# Patient Record
Sex: MALE | Race: WHITE | NOT HISPANIC OR LATINO
[De-identification: names, ages, dates, MRNs, and addresses within clinical notes are randomized per-mention and may not be internally consistent; named-entity substitution may affect disease eponyms.]

---

## 2017-05-23 ENCOUNTER — APPOINTMENT (OUTPATIENT)
Dept: CARDIOLOGY | Facility: CLINIC | Age: 32
End: 2017-05-23

## 2017-05-23 ENCOUNTER — APPOINTMENT (OUTPATIENT)
Dept: CV DIAGNOSITCS | Facility: HOSPITAL | Age: 32
End: 2017-05-23
Payer: COMMERCIAL

## 2017-05-23 ENCOUNTER — NON-APPOINTMENT (OUTPATIENT)
Age: 32
End: 2017-05-23

## 2017-05-23 ENCOUNTER — OUTPATIENT (OUTPATIENT)
Dept: OUTPATIENT SERVICES | Facility: HOSPITAL | Age: 32
LOS: 1 days | End: 2017-05-23

## 2017-05-23 VITALS — BODY MASS INDEX: 25.1 KG/M2 | WEIGHT: 240 LBS

## 2017-05-23 DIAGNOSIS — R07.9 CHEST PAIN, UNSPECIFIED: ICD-10-CM

## 2017-05-23 DIAGNOSIS — Z00.00 ENCOUNTER FOR GENERAL ADULT MEDICAL EXAMINATION WITHOUT ABNORMAL FINDINGS: ICD-10-CM

## 2017-05-23 DIAGNOSIS — I71.9 AORTIC ANEURYSM OF UNSPECIFIED SITE, WITHOUT RUPTURE: ICD-10-CM

## 2017-05-23 PROCEDURE — 93306 TTE W/DOPPLER COMPLETE: CPT | Mod: 26

## 2017-06-20 ENCOUNTER — MEDICATION RENEWAL (OUTPATIENT)
Age: 32
End: 2017-06-20

## 2017-06-27 ENCOUNTER — RX RENEWAL (OUTPATIENT)
Age: 32
End: 2017-06-27

## 2017-07-31 ENCOUNTER — MEDICATION RENEWAL (OUTPATIENT)
Age: 32
End: 2017-07-31

## 2018-05-01 ENCOUNTER — APPOINTMENT (OUTPATIENT)
Dept: CARDIOLOGY | Facility: CLINIC | Age: 33
End: 2018-05-01

## 2018-05-22 ENCOUNTER — NON-APPOINTMENT (OUTPATIENT)
Age: 33
End: 2018-05-22

## 2018-05-22 ENCOUNTER — APPOINTMENT (OUTPATIENT)
Dept: CV DIAGNOSITCS | Facility: HOSPITAL | Age: 33
End: 2018-05-22
Payer: COMMERCIAL

## 2018-05-22 ENCOUNTER — OUTPATIENT (OUTPATIENT)
Dept: OUTPATIENT SERVICES | Facility: HOSPITAL | Age: 33
LOS: 1 days | End: 2018-05-22

## 2018-05-22 ENCOUNTER — APPOINTMENT (OUTPATIENT)
Dept: CARDIOLOGY | Facility: CLINIC | Age: 33
End: 2018-05-22
Payer: COMMERCIAL

## 2018-05-22 VITALS
WEIGHT: 261 LBS | BODY MASS INDEX: 30.2 KG/M2 | OXYGEN SATURATION: 99 % | RESPIRATION RATE: 16 BRPM | SYSTOLIC BLOOD PRESSURE: 143 MMHG | HEART RATE: 82 BPM | HEIGHT: 78 IN | DIASTOLIC BLOOD PRESSURE: 81 MMHG

## 2018-05-22 DIAGNOSIS — Q87.40 MARFAN SYNDROME, UNSPECIFIED: ICD-10-CM

## 2018-05-22 DIAGNOSIS — I71.9 AORTIC ANEURYSM OF UNSPECIFIED SITE, WITHOUT RUPTURE: ICD-10-CM

## 2018-05-22 PROCEDURE — 99213 OFFICE O/P EST LOW 20 MIN: CPT

## 2018-05-22 PROCEDURE — 93306 TTE W/DOPPLER COMPLETE: CPT | Mod: 26

## 2018-05-22 PROCEDURE — 93000 ELECTROCARDIOGRAM COMPLETE: CPT

## 2018-06-05 ENCOUNTER — APPOINTMENT (OUTPATIENT)
Dept: CARDIOLOGY | Facility: CLINIC | Age: 33
End: 2018-06-05

## 2019-05-07 ENCOUNTER — APPOINTMENT (OUTPATIENT)
Dept: CV DIAGNOSITCS | Facility: HOSPITAL | Age: 34
End: 2019-05-07

## 2019-05-07 ENCOUNTER — OUTPATIENT (OUTPATIENT)
Dept: OUTPATIENT SERVICES | Facility: HOSPITAL | Age: 34
LOS: 1 days | End: 2019-05-07
Payer: COMMERCIAL

## 2019-05-07 ENCOUNTER — NON-APPOINTMENT (OUTPATIENT)
Age: 34
End: 2019-05-07

## 2019-05-07 ENCOUNTER — TRANSCRIPTION ENCOUNTER (OUTPATIENT)
Age: 34
End: 2019-05-07

## 2019-05-07 ENCOUNTER — APPOINTMENT (OUTPATIENT)
Dept: CARDIOLOGY | Facility: CLINIC | Age: 34
End: 2019-05-07
Payer: COMMERCIAL

## 2019-05-07 VITALS
DIASTOLIC BLOOD PRESSURE: 77 MMHG | OXYGEN SATURATION: 99 % | SYSTOLIC BLOOD PRESSURE: 134 MMHG | HEIGHT: 78 IN | BODY MASS INDEX: 28.13 KG/M2 | HEART RATE: 63 BPM | WEIGHT: 243.13 LBS

## 2019-05-07 DIAGNOSIS — I71.9 AORTIC ANEURYSM OF UNSPECIFIED SITE, WITHOUT RUPTURE: ICD-10-CM

## 2019-05-07 PROCEDURE — 99214 OFFICE O/P EST MOD 30 MIN: CPT

## 2019-05-07 PROCEDURE — 93306 TTE W/DOPPLER COMPLETE: CPT | Mod: 26

## 2019-05-07 PROCEDURE — 93000 ELECTROCARDIOGRAM COMPLETE: CPT

## 2019-05-09 LAB
ALBUMIN SERPL ELPH-MCNC: 5 G/DL
ALP BLD-CCNC: 56 U/L
ALT SERPL-CCNC: 19 U/L
ANION GAP SERPL CALC-SCNC: 17 MMOL/L
AST SERPL-CCNC: 22 U/L
BASOPHILS # BLD AUTO: 0.03 K/UL
BASOPHILS NFR BLD AUTO: 0.5 %
BILIRUB SERPL-MCNC: 1.2 MG/DL
BUN SERPL-MCNC: 11 MG/DL
CALCIUM SERPL-MCNC: 10.4 MG/DL
CHLORIDE SERPL-SCNC: 101 MMOL/L
CHOLEST SERPL-MCNC: 247 MG/DL
CHOLEST/HDLC SERPL: 5.6 RATIO
CO2 SERPL-SCNC: 24 MMOL/L
CREAT SERPL-MCNC: 0.85 MG/DL
EOSINOPHIL # BLD AUTO: 0.07 K/UL
EOSINOPHIL NFR BLD AUTO: 1.1 %
ESTIMATED AVERAGE GLUCOSE: 91 MG/DL
GLUCOSE SERPL-MCNC: 71 MG/DL
HBA1C MFR BLD HPLC: 4.8 %
HCT VFR BLD CALC: 46.8 %
HDLC SERPL-MCNC: 44 MG/DL
HGB BLD-MCNC: 16.1 G/DL
IMM GRANULOCYTES NFR BLD AUTO: 0.3 %
LDLC SERPL CALC-MCNC: 158 MG/DL
LYMPHOCYTES # BLD AUTO: 1.45 K/UL
LYMPHOCYTES NFR BLD AUTO: 22.6 %
MAN DIFF?: NORMAL
MCHC RBC-ENTMCNC: 30.4 PG
MCHC RBC-ENTMCNC: 34.4 GM/DL
MCV RBC AUTO: 88.3 FL
MONOCYTES # BLD AUTO: 0.44 K/UL
MONOCYTES NFR BLD AUTO: 6.9 %
NEUTROPHILS # BLD AUTO: 4.41 K/UL
NEUTROPHILS NFR BLD AUTO: 68.6 %
PLATELET # BLD AUTO: 240 K/UL
POTASSIUM SERPL-SCNC: 3.9 MMOL/L
PROT SERPL-MCNC: 8.1 G/DL
RBC # BLD: 5.3 M/UL
RBC # FLD: 13.2 %
SODIUM SERPL-SCNC: 142 MMOL/L
TRIGL SERPL-MCNC: 223 MG/DL
WBC # FLD AUTO: 6.42 K/UL

## 2019-06-21 NOTE — ASSESSMENT
[FreeTextEntry1] : 33 year old white male Marfan syndrome, s/p Ao aneurysm repair with residual severe AR. \par \par Severe AR- will refer back to Dr. Cloud for reassessment of aortic valve. \par Will discuss with Dr. Cloud whether RADHA may be indicated at [place] or preference at [place].\par \par Palpitations and symptoms with exertion - concerning for exercise induced arrhythmia \par Cor Vitals holter monitor 48 years during exercise to assess arrhythmia during periods of exertion\par \par Anxiety - he is not willing to see Dr Jeong and would prefer to manage on his own.\par \par

## 2019-06-21 NOTE — REASON FOR VISIT
[FreeTextEntry1] : 33 year old white male with history of  Marfan syndrome,  s/p aortic aneurysm repair by Dr. Gómez Cloud on June 16 , 2015 with residual AR that has been progressing in severity. \par \par Has been trying to jog and be more active but after a jog for 1/2 mi , he begins to feel "unusual heart beats/ palpitations"  with associated symptoms of  lightheadedness  and then feels compelled to stop running.\par \par Of note, he has stopped his psych meds and his complaints of excessive perspiration has stopped . \par He has been challenged to find a good therapist and is trying on his own to manage his anxiety.\par \par He denies any significant dyspnea on exertion.

## 2019-06-21 NOTE — PHYSICAL EXAM
[Normal Conjunctiva] : the conjunctiva exhibited no abnormalities [Eyelids - No Xanthelasma] : the eyelids demonstrated no xanthelasmas [Normal Oral Mucosa] : normal oral mucosa [No Oral Pallor] : no oral pallor [No Oral Cyanosis] : no oral cyanosis [Normal Jugular Venous A Waves Present] : normal jugular venous A waves present [Normal Jugular Venous V Waves Present] : normal jugular venous V waves present [No Jugular Venous Cary A Waves] : no jugular venous cary A waves [Auscultation Breath Sounds / Voice Sounds] : lungs were clear to auscultation bilaterally [Respiration, Rhythm And Depth] : normal respiratory rhythm and effort [Exaggerated Use Of Accessory Muscles For Inspiration] : no accessory muscle use [No Precordial Heave] : no precordial heave was noted [Normal] : normal [Normal Rate] : normal [Rhythm Regular] : regular [Normal S1] : normal S1 [Normal S2] : normal S2 [II] : a grade 2 [No Murmur] : no murmurs heard [No Abnormalities] : the abdominal aorta was not enlarged and no bruit was heard [2+] : right 2+ [No Pitting Edema] : no pitting edema present [Abdomen Soft] : soft [Abdomen Mass (___ Cm)] : no abdominal mass palpated [Abdomen Tenderness] : non-tender [Abnormal Walk] : normal gait [Gait - Sufficient For Exercise Testing] : the gait was sufficient for exercise testing [Cyanosis, Localized] : no localized cyanosis [Nail Clubbing] : no clubbing of the fingernails [Petechial Hemorrhages (___cm)] : no petechial hemorrhages [Skin Color & Pigmentation] : normal skin color and pigmentation [] : no rash [Skin Lesions] : no skin lesions [No Venous Stasis] : no venous stasis [No Skin Ulcers] : no skin ulcer [No Xanthoma] : no  xanthoma was observed [Mood] : the mood was normal [Oriented To Time, Place, And Person] : oriented to person, place, and time [Affect] : the affect was normal [No Anxiety] : not feeling anxious [S3] : no S3 [S4] : no S4 [Click] : no click [Left Carotid Bruit] : no bruit heard over the left carotid [Right Carotid Bruit] : no bruit heard over the right carotid [Left Femoral Bruit] : no bruit heard over the left femoral artery [Right Femoral Bruit] : no bruit heard over the right femoral artery [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

## 2019-12-11 ENCOUNTER — APPOINTMENT (OUTPATIENT)
Dept: VASCULAR SURGERY | Facility: CLINIC | Age: 34
End: 2019-12-11
Payer: COMMERCIAL

## 2019-12-11 VITALS
DIASTOLIC BLOOD PRESSURE: 72 MMHG | HEART RATE: 74 BPM | WEIGHT: 220 LBS | HEIGHT: 78 IN | SYSTOLIC BLOOD PRESSURE: 138 MMHG | BODY MASS INDEX: 25.45 KG/M2

## 2019-12-11 PROCEDURE — 93971 EXTREMITY STUDY: CPT

## 2019-12-11 PROCEDURE — 99204 OFFICE O/P NEW MOD 45 MIN: CPT

## 2019-12-17 NOTE — PHYSICAL EXAM
[Respiratory Effort] : normal respiratory effort [No Rash or Lesion] : No rash or lesion [Alert] : alert [Oriented to Person] : oriented to person [Oriented to Place] : oriented to place [Oriented to Time] : oriented to time [Calm] : calm [Varicose Veins Of Lower Extremities] : present [Ankle Swelling Bilaterally] : severe [Varicose Veins Of The Right Leg] : of the right leg [2+] : right 2+ [JVD] : no jugular venous distention  [Ankle Swelling (On Exam)] : not present [] : not present [de-identified] : Well appearing, NAD [FreeTextEntry1] : Bulging varicose veins on posterior aspect of right leg at the level of the popliteal fossa and extending to proximal calf  [de-identified] : NCAT [de-identified] : FROM

## 2019-12-17 NOTE — PROCEDURE
[FreeTextEntry1] : RLE venous duplex shows no signs of DVT/SVT. No deep reflux noted. R GSV and SSV closed s/p ablation. Posterior vv 6.8mm

## 2019-12-17 NOTE — ASSESSMENT
[Arterial/Venous Disease] : arterial/venous disease [FreeTextEntry1] : 35 y/o M with Hx of R GSV closure, now with symptomatic RLE bulging varicose veins. On exam, there is a large bulging varicose vein noted to the right posterior leg. RLE venous duplex shows no signs of DVT/SVT. No deep reflux noted. R GSV and SSV closed s/p ablation. Given his symptoms, I recommended the patient with a RLE stab phlebectomy. The surgical approach, risks, benefits, and alternatives to the proposed procedure were discussed with the patient and all questions were answered to their satisfaction. Pre and Post-operative instructions were provided to the patient. Patient understands and agrees to undergo the proposed procedure. Will schedule for this in office. \par \par \par

## 2019-12-17 NOTE — END OF VISIT
[FreeTextEntry3] : All medical record entries made by the Scribe were at my, Dr. Zamora's direction and personally dictated by me on 12/11/2019 I have reviewed the chart and agree that the record accurately reflects my personal performance of the history, physical exam, assessment and plan. I have also personally directed, reviewed, and agreed with the chart.\par

## 2019-12-17 NOTE — HISTORY OF PRESENT ILLNESS
[FreeTextEntry1] : 35 y/o M with Hx of Ascending aortic aneurysm, aortic valve replacement (by , uncomplicated, being managed annually), Marfan's syndrome, and R GSV closure, presents here today for initial evaluation of his veins. He was referred here by Dr. Hira Cedillo. He reports here feeling well. He has had a procedure to close his right GSV. However, he is still experiencing bulging varicose veins to his right leg. He states that the veins began bulging again about a year ago. He states that the veins are heavy, burn and painful. No reports of varicose veins to the left. 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

## 2019-12-17 NOTE — ADDENDUM
[FreeTextEntry1] : Documented by Chad Cox acting as a scribe for Dr. Percy Zamora on 12/11/2019\par

## 2020-01-31 ENCOUNTER — APPOINTMENT (OUTPATIENT)
Dept: VASCULAR SURGERY | Facility: CLINIC | Age: 35
End: 2020-01-31
Payer: COMMERCIAL

## 2020-01-31 PROCEDURE — 37766 PHLEB VEINS - EXTREM 20+: CPT | Mod: RT

## 2020-02-03 NOTE — PROCEDURE
[FreeTextEntry3] : Surgeon: Percy Zamora M.D.\par \par Pre-Op Diagnosis:  Bulging symptomatic varicose veins in the Right leg\par \par Post-Op Diagnosis:  Same\par \par Procedure:  Stab avulsion excision of numerous venous varices from Right leg, with more than 20 incisions.  \par \par Anesthesia: Local \par \par Procedure: The varices were marked and then the patient’s leg was prepped and draped in a sterile fashion.  the patient was placed prone on the operating table.  1 % Lidocaine was infiltrated under the skin around the venous varies.  Using the stab avulsion technique with a #11 blade, the multiple varices were individually excised with fine mosquito clamps.  After the veins were excised the wounds were washed with hydrogen peroxide and closed with 6-0 nylon sutures.  Patient tolerated the procedure well.\par \par The leg was then washed and dried and covered with Kerlix rolled gauze and layers of Ace bandages. Aftercare instructions were given to patient, FU in 5-7 days for suture removal.  \par

## 2020-02-05 ENCOUNTER — APPOINTMENT (OUTPATIENT)
Dept: VASCULAR SURGERY | Facility: CLINIC | Age: 35
End: 2020-02-05
Payer: COMMERCIAL

## 2020-02-05 DIAGNOSIS — I83.891 VARICOSE VEINS OF RIGHT LOWER EXTREMITY WITH OTHER COMPLICATIONS: ICD-10-CM

## 2020-02-05 PROCEDURE — 99024 POSTOP FOLLOW-UP VISIT: CPT

## 2020-02-13 NOTE — HISTORY OF PRESENT ILLNESS
[FreeTextEntry1] : 33 y/o M with PMHx of ascending aortic aneurysm, aortic valve replacement (by , uncomplicated, being managed annually), Marfan's syndrome, and R GSV closure, s/p RLE stab phlebectomy on 1/31/2020, presents here today for follow up. He was initially referred here by Dr. Hira Cedillo. He reports here feeling well. He is happy with his results but states that there are still some varicose veins present to his right leg, popliteal region. Denies any fevers, chills, redness to incisional area, CP, SOB or calf pain. 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

## 2020-02-13 NOTE — END OF VISIT
[FreeTextEntry3] : All medical record entries made by the Scribe were at my, Dr. Zamora's direction and personally dictated by me on 02/05/2020 I have reviewed the chart and agree that the record accurately reflects my personal performance of the history, physical exam, assessment and plan. I have also personally directed, reviewed, and agreed with the chart.\par

## 2020-02-13 NOTE — PHYSICAL EXAM
[Respiratory Effort] : normal respiratory effort [2+] : left 2+ [Varicose Veins Of Lower Extremities] : present [Varicose Veins Of The Right Leg] : of the right leg [Ankle Swelling Bilaterally] : severe [No Rash or Lesion] : No rash or lesion [Alert] : alert [Oriented to Person] : oriented to person [Oriented to Place] : oriented to place [Oriented to Time] : oriented to time [Calm] : calm [Normal Rate and Rhythm] : normal rate and rhythm [JVD] : no jugular venous distention  [] : not present [Ankle Swelling (On Exam)] : not present [de-identified] : Well appearing, NAD [de-identified] : NCAT [FreeTextEntry1] : Small bulging varicose veins on posterior aspect of right leg at the level of the popliteal fossa. Incision from stabs procedure clean, dry with very mild ecchymosis and no signs of infections; stitches removed during visit and steristrips applied. [de-identified] : See cardiovasc. [de-identified] : FROM

## 2020-02-13 NOTE — PROCEDURE
[FreeTextEntry1] : Surgeon: Percy Zamora M.D.\par \par Pre-Op Diagnosis:  Bulging symptomatic varicose veins in the Right posterior leg\par \par Post-Op Diagnosis:  Same\par \par Procedure:  Stab avulsion excision of numerous venous varices from Right leg, with 1 incision.  \par \par Anesthesia: Local \par \par Procedure: The varices were marked and then the patient’s leg was prepped and draped in a sterile fashion. 1 % Lidocaine was infiltrated under the skin around the venous varies.  Using the stab avulsion technique with a #11 blade, the multiple varices were individually excised with fine mosquito clamps.  After the veins were excised the wounds were washed with hydrogen peroxide and closed with 4-0 monocril sutures.  Patient tolerated the procedure well.\par \par The leg was then washed and dried and covered with Kerlix rolled gauze and layers of Ace bandages. Aftercare instructions were given to patient.

## 2020-02-13 NOTE — ADDENDUM
[FreeTextEntry1] : Documented by Chad Cox acting as a scribe for Dr. Percy Zamora on 02/05/2020\par

## 2020-02-13 NOTE — ASSESSMENT
[Arterial/Venous Disease] : arterial/venous disease [FreeTextEntry1] : 33 y/o M with Hx of R GSV closure and s/p RLE stab phlebectomy on 1/31/2020 with residual RLE bulging varicose veins. Patient is doing well s/p R stab phlebectomy but there are still presence of small bulging varicose veins to the posterior aspect of the RLE at the level of the popliteal fossa. I recommended patient with another RLE stab phlebectomy to be done in the office today. The surgical approach, risks, benefits, and alternatives to the proposed procedure were discussed with the patient and all questions were answered to their satisfaction.  Patient understands and consented to undergo the proposed procedure today. Patient was placed in a comfortable position and stab phlebectomy was performed under sterile conditions. He tolerated the procedure well. He is to follow up here PRN. \par \par \par \par \par

## 2020-09-22 ENCOUNTER — NON-APPOINTMENT (OUTPATIENT)
Age: 35
End: 2020-09-22

## 2020-09-22 ENCOUNTER — APPOINTMENT (OUTPATIENT)
Dept: CARDIOLOGY | Facility: CLINIC | Age: 35
End: 2020-09-22
Payer: COMMERCIAL

## 2020-09-22 ENCOUNTER — APPOINTMENT (OUTPATIENT)
Dept: CV DIAGNOSITCS | Facility: HOSPITAL | Age: 35
End: 2020-09-22
Payer: COMMERCIAL

## 2020-09-22 ENCOUNTER — OUTPATIENT (OUTPATIENT)
Dept: OUTPATIENT SERVICES | Facility: HOSPITAL | Age: 35
LOS: 1 days | End: 2020-09-22

## 2020-09-22 VITALS
WEIGHT: 210 LBS | TEMPERATURE: 96.3 F | BODY MASS INDEX: 24.3 KG/M2 | HEIGHT: 78 IN | OXYGEN SATURATION: 99 % | RESPIRATION RATE: 16 BRPM | SYSTOLIC BLOOD PRESSURE: 136 MMHG | DIASTOLIC BLOOD PRESSURE: 80 MMHG | HEART RATE: 76 BPM

## 2020-09-22 DIAGNOSIS — I10 ESSENTIAL (PRIMARY) HYPERTENSION: ICD-10-CM

## 2020-09-22 DIAGNOSIS — I71.9 AORTIC ANEURYSM OF UNSPECIFIED SITE, WITHOUT RUPTURE: ICD-10-CM

## 2020-09-22 PROCEDURE — 93350 STRESS TTE ONLY: CPT | Mod: 26

## 2020-09-22 PROCEDURE — 93325 DOPPLER ECHO COLOR FLOW MAPG: CPT | Mod: 26,GC

## 2020-09-22 PROCEDURE — 99214 OFFICE O/P EST MOD 30 MIN: CPT

## 2020-09-22 PROCEDURE — 93320 DOPPLER ECHO COMPLETE: CPT | Mod: 26,GC

## 2020-09-22 PROCEDURE — 93000 ELECTROCARDIOGRAM COMPLETE: CPT

## 2020-10-01 NOTE — REASON FOR VISIT
[FreeTextEntry1] : 33 year old white male with history of  Marfan syndrome,  s/p aortic aneurysm repair by Dr. Gómez Cloud on June 16 , 2015 with residual AR that has been progressing in severity. \par \par Has been trying to jog and be more active but after a jog for 1/2 mi , he begins to feel "unusual heart beats/ palpitations"  with associated symptoms of  lightheadedness  and then feels compelled to stop running.\par \par Of note, he has stopped his psych meds and his complaints of excessive perspiration has stopped . \par He has been challenged to find a good therapist and is trying on his own to manage his anxiety.\par \par He denies any significant dyspnea on exertion.\par \par Interval Note\par September 22, 2020\par \par Patient returns for a cardiac followup. Blood pressure today is 130/80. EKG demonstrating sinus rhythm @ 74 bpm. \par He has lost a great deal of weight, but reports that has been intentional.\par \par Lester admits that he has stopped running and has greatly reduced the amount of exercise he had been doing previously.\par \par He repeated a stress echo today and performed reasonably well with 10 METS of activity. However, aortic valve has been reported with moderately to severe AR.\par \par

## 2020-10-01 NOTE — ASSESSMENT
[FreeTextEntry1] : 33 year old white male with history of  Marfan syndrome,  s/p aortic aneurysm repair by Dr. Gómez Cloud on June 16 , 2015 with residual AR that has been progressing in severity. \par \par Most recent stress echo demonstrated: \par 10 METS of activity\par Normal mitral valve\par Moderate to severe aortic regurgitation , which may have been underestimated. The jet of AR is eccentric.\par In apical views there appears to be possible prolapse involving one of the aortic valve leaflets.\par Peak transaortic valve gradient equals 23 mm Hg, mean transaortic valve gradient equals 13 mm Hg.\par \par Plan to send stress echo and disc to Dr. Gómez Cloud\par Will fax office notes and EKG for review\par \par \par

## 2020-10-01 NOTE — PHYSICAL EXAM
[Normal Conjunctiva] : the conjunctiva exhibited no abnormalities [Eyelids - No Xanthelasma] : the eyelids demonstrated no xanthelasmas [Normal Oral Mucosa] : normal oral mucosa [No Oral Pallor] : no oral pallor [No Oral Cyanosis] : no oral cyanosis [Normal Jugular Venous A Waves Present] : normal jugular venous A waves present [Normal Jugular Venous V Waves Present] : normal jugular venous V waves present [No Jugular Venous Cary A Waves] : no jugular venous cary A waves [Respiration, Rhythm And Depth] : normal respiratory rhythm and effort [Exaggerated Use Of Accessory Muscles For Inspiration] : no accessory muscle use [Auscultation Breath Sounds / Voice Sounds] : lungs were clear to auscultation bilaterally [Abdomen Soft] : soft [Abdomen Tenderness] : non-tender [Abdomen Mass (___ Cm)] : no abdominal mass palpated [Abnormal Walk] : normal gait [Gait - Sufficient For Exercise Testing] : the gait was sufficient for exercise testing [Nail Clubbing] : no clubbing of the fingernails [Cyanosis, Localized] : no localized cyanosis [Petechial Hemorrhages (___cm)] : no petechial hemorrhages [Skin Color & Pigmentation] : normal skin color and pigmentation [] : no rash [No Venous Stasis] : no venous stasis [Skin Lesions] : no skin lesions [No Skin Ulcers] : no skin ulcer [No Xanthoma] : no  xanthoma was observed [Oriented To Time, Place, And Person] : oriented to person, place, and time [Affect] : the affect was normal [Mood] : the mood was normal [No Anxiety] : not feeling anxious [Normal] : normal [No Precordial Heave] : no precordial heave was noted [Normal Rate] : normal [Rhythm Regular] : regular [Normal S1] : normal S1 [Normal S2] : normal S2 [No Murmur] : no murmurs heard [II] : a grade 2 [2+] : left 2+ [No Abnormalities] : the abdominal aorta was not enlarged and no bruit was heard [No Pitting Edema] : no pitting edema present [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

## 2021-07-12 ENCOUNTER — RX RENEWAL (OUTPATIENT)
Age: 36
End: 2021-07-12

## 2021-10-29 ENCOUNTER — RX RENEWAL (OUTPATIENT)
Age: 36
End: 2021-10-29

## 2021-12-17 ENCOUNTER — TRANSCRIPTION ENCOUNTER (OUTPATIENT)
Age: 36
End: 2021-12-17

## 2022-01-25 ENCOUNTER — NON-APPOINTMENT (OUTPATIENT)
Age: 37
End: 2022-01-25

## 2022-01-25 ENCOUNTER — OUTPATIENT (OUTPATIENT)
Dept: OUTPATIENT SERVICES | Facility: HOSPITAL | Age: 37
LOS: 1 days | End: 2022-01-25

## 2022-01-25 ENCOUNTER — APPOINTMENT (OUTPATIENT)
Dept: CARDIOLOGY | Facility: CLINIC | Age: 37
End: 2022-01-25
Payer: COMMERCIAL

## 2022-01-25 ENCOUNTER — APPOINTMENT (OUTPATIENT)
Dept: ELECTROPHYSIOLOGY | Facility: CLINIC | Age: 37
End: 2022-01-25
Payer: COMMERCIAL

## 2022-01-25 ENCOUNTER — APPOINTMENT (OUTPATIENT)
Dept: CV DIAGNOSITCS | Facility: HOSPITAL | Age: 37
End: 2022-01-25
Payer: COMMERCIAL

## 2022-01-25 VITALS
HEIGHT: 78 IN | SYSTOLIC BLOOD PRESSURE: 125 MMHG | DIASTOLIC BLOOD PRESSURE: 64 MMHG | HEART RATE: 60 BPM | BODY MASS INDEX: 26.03 KG/M2 | WEIGHT: 225 LBS | OXYGEN SATURATION: 100 %

## 2022-01-25 DIAGNOSIS — I47.1 SUPRAVENTRICULAR TACHYCARDIA: ICD-10-CM

## 2022-01-25 DIAGNOSIS — Q87.40 MARFAN SYNDROME, UNSPECIFIED: ICD-10-CM

## 2022-01-25 DIAGNOSIS — J93.83 OTHER PNEUMOTHORAX: ICD-10-CM

## 2022-01-25 DIAGNOSIS — F41.9 ANXIETY DISORDER, UNSPECIFIED: ICD-10-CM

## 2022-01-25 DIAGNOSIS — R00.2 PALPITATIONS: ICD-10-CM

## 2022-01-25 PROCEDURE — 99214 OFFICE O/P EST MOD 30 MIN: CPT

## 2022-01-25 PROCEDURE — 93000 ELECTROCARDIOGRAM COMPLETE: CPT

## 2022-01-25 PROCEDURE — 93306 TTE W/DOPPLER COMPLETE: CPT | Mod: 26

## 2022-01-25 PROCEDURE — 99204 OFFICE O/P NEW MOD 45 MIN: CPT

## 2022-01-25 RX ORDER — CEPHALEXIN 500 MG/1
500 CAPSULE ORAL
Qty: 6 | Refills: 0 | Status: DISCONTINUED | COMMUNITY
Start: 2019-12-11 | End: 2022-01-25

## 2022-01-25 NOTE — DISCUSSION/SUMMARY
[FreeTextEntry1] : Impression:\par \par 1. 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 Given recurrent/documented symptomatic SVT, discussed treatment options including rate control management vs ablation. Risks, benefits, and alternatives to procedure discussed at length. Prefers to avoid ablation at this time. If episodes become more frequent or bothersome, will consider in future. For now, discussed self vagal maneuvers.\par \par 2. HTN: resume oral antihypertensives as prescribed. Encouraged heart healthy diet, sodium restriction, and weight loss. Continue regular f/u with Cardiologist for further HTN management.\par \par Will continue f/u with Cardiologist and may RTO as needed or if any new or worsening symptoms or findings occur.

## 2022-01-25 NOTE — CARDIOLOGY SUMMARY
[de-identified] : September 22, 2020, mitral valve prolapse involving the anterior nitral leaflet, minimal MR, *severe aortic regurgitation with eccentric jets LVEF 72%.

## 2022-01-25 NOTE — HISTORY OF PRESENT ILLNESS
[FreeTextEntry1] : Lester Ruiz is a 37y/o man with Hx of Marfan syndrome, s/p aortic aneurysm repair by Dr. Gómez Cloud on June 16 , 2015 with residual AR ( moderate - severe), HTN, and recurrent palpitations with noted SVT on Holter who presents today for initial evaluation. Admits recurrent episodes of palpitations, typically brief and tolerable. Episodes do not occur frequently but when they do they start suddenly and end abruptly. Was able to capture and episode on his phone which reveals an EKG suspicious for likely AVNRT. Denies chest pain, SOB, syncope or near syncope.\par

## 2022-02-01 NOTE — CARDIOLOGY SUMMARY
[___] : [unfilled] [LVEF ___%] : LVEF [unfilled]% [de-identified] : CONCLUSIONS:\par 1. Mild anterior mitral leaflet prolapse. Minimal mitral\par regurgitation.\par 2. Aortic valve morphology not well visualized. Moderate to\par severe eccentric aortic regurgitation.\par 3. Moderately dilated left atrium.  LA volume index = 44\par cc/m2.\par 4. Eccentric left ventricular hypertrophy (dilated left\par ventricle with normal relative wall thickness).\par 5. Normal left ventricular systolic function. No segmental\par wall motion abnormalities.\par 6. Normal right ventricular size and function.\par ------------------------------------------------------------------------\par Confirmed on  1/26/2022 - 17:50:26 by laurie Dickens M.D. RPVI\par

## 2022-02-01 NOTE — HISTORY OF PRESENT ILLNESS
[FreeTextEntry1] : 33 year old white male with history of  Marfan syndrome,  s/p aortic aneurysm repair by Dr. Gómez Cloud on June 16 , 2015 with residual AR ( moderate - severe) . He reports intermittent palps . In the past he wore holter that was inconclusive. Mr. Ruiz was able to identify a SVT on the phone likely to be short RP tachycardia.\par \par # Marfans : s/p aortic aneurysm repair ( 2015) \par AR mod - severe \par asymptomatic \par \par # HTN BP stable \par BP Stable \par Continue Losartan 50 mg QD \par Encouraged Patient to monitor BP at home and keep a log and report results back to us for evaluation. Based on results, we will adjust medications as necessary. \par Additionally, encouraged heart healthy diet and exercise as tolerated.\par EKG with no acute changes.\par \par # Palpitations: SVT likely short RP tachycardia \par EP consultation recommended \par

## 2022-02-01 NOTE — ASSESSMENT
[FreeTextEntry1] : 33 year old white male with history of  Marfan syndrome,  s/p aortic aneurysm repair by Dr. Gómez Cloud on June 16 , 2015 with residual AR ( moderate - severe) . He reports intermittent palps . In the past he wore holter that was inconclusive. Mr. Ruiz was able to identify a SVT on the phone likely to be short RP tachycardia.\par \par # Marfans : s/p aortic aneurysm repair ( 2015) \par AR mod - severe \par asymptomatic \par \par # HTN BP stable \par BP Stable \par Continue Losartan 50 mg QD \par Encouraged Patient to monitor BP at home and keep a log and report results back to us for evaluation. Based on results, we will adjust medications as necessary. \par Additionally, encouraged heart healthy diet and exercise as tolerated.\par EKG with no acute changes.\par \par # Palpitations: SVT likely short RP tachycardia \par EP consultation recommended \par \par 
Alert and oriented x 3, normal mood and affect, no apparent risk to self or others.

## 2022-11-29 ENCOUNTER — NON-APPOINTMENT (OUTPATIENT)
Age: 37
End: 2022-11-29

## 2023-01-10 ENCOUNTER — APPOINTMENT (OUTPATIENT)
Dept: CV DIAGNOSITCS | Facility: HOSPITAL | Age: 38
End: 2023-01-10
Payer: COMMERCIAL

## 2023-01-10 ENCOUNTER — OUTPATIENT (OUTPATIENT)
Dept: OUTPATIENT SERVICES | Facility: HOSPITAL | Age: 38
LOS: 1 days | End: 2023-01-10

## 2023-01-10 ENCOUNTER — NON-APPOINTMENT (OUTPATIENT)
Age: 38
End: 2023-01-10

## 2023-01-10 ENCOUNTER — APPOINTMENT (OUTPATIENT)
Dept: CARDIOLOGY | Facility: CLINIC | Age: 38
End: 2023-01-10
Payer: COMMERCIAL

## 2023-01-10 VITALS
SYSTOLIC BLOOD PRESSURE: 132 MMHG | BODY MASS INDEX: 25.45 KG/M2 | HEART RATE: 59 BPM | OXYGEN SATURATION: 100 % | DIASTOLIC BLOOD PRESSURE: 69 MMHG | HEIGHT: 78 IN | WEIGHT: 220 LBS

## 2023-01-10 DIAGNOSIS — Z00.00 ENCOUNTER FOR GENERAL ADULT MEDICAL EXAMINATION W/OUT ABNORMAL FINDINGS: ICD-10-CM

## 2023-01-10 DIAGNOSIS — Z98.890 OTHER SPECIFIED POSTPROCEDURAL STATES: ICD-10-CM

## 2023-01-10 DIAGNOSIS — R00.2 PALPITATIONS: ICD-10-CM

## 2023-01-10 DIAGNOSIS — Z86.79 OTHER SPECIFIED POSTPROCEDURAL STATES: ICD-10-CM

## 2023-01-10 DIAGNOSIS — Q87.40 MARFAN SYNDROME, UNSPECIFIED: ICD-10-CM

## 2023-01-10 DIAGNOSIS — I71.9 AORTIC ANEURYSM OF UNSPECIFIED SITE, WITHOUT RUPTURE: ICD-10-CM

## 2023-01-10 PROCEDURE — 99215 OFFICE O/P EST HI 40 MIN: CPT | Mod: 25

## 2023-01-10 PROCEDURE — 93306 TTE W/DOPPLER COMPLETE: CPT | Mod: 26

## 2023-01-10 PROCEDURE — 93000 ELECTROCARDIOGRAM COMPLETE: CPT

## 2023-01-12 PROBLEM — R00.2 INTERMITTENT PALPITATIONS: Status: ACTIVE | Noted: 2019-05-07

## 2023-01-12 PROBLEM — Z98.890 H/O AORTIC ANEURYSM REPAIR: Status: RESOLVED | Noted: 2023-01-12 | Resolved: 2023-01-12

## 2023-01-12 NOTE — CARDIOLOGY SUMMARY
[___] : [unfilled] [LVEF ___%] : LVEF [unfilled]% [___] : [unfilled] [de-identified] : January 10, 2023\par Sinus bradycardia, nonspecific T abnormality @ 59 bpm  [de-identified] : CONCLUSIONS:\par 1. Normal mitral valve. Minimal mitral regurgitation.\par 2. Aortic valve leaflet morphology not well visualized.\par Peak transaortic valve gradient equals 28 mm Hg, mean\par transaortic valve gradient equals 15 mm Hg. Moderate-severe\par aortic regurgitation.\par 3. Normal left ventricular internal dimensions and wall\par thicknesses.\par 4. Normal left ventricular systolic function. No segmental\par wall motion abnormalities.\par 5. Normal left ventricular diastolic function.\par 6. Normal right ventricular size and function.\par *** Compared with echocardiogram of 01/25/2022, no\par significant changes noted.\par ------------------------------------------------------------------------\par Confirmed on  1/10/2023 - 11:41:13 by Marcin Bates M.D.,\par [facility]\par ------------------------------------------------------------------------

## 2023-01-12 NOTE — HISTORY OF PRESENT ILLNESS
[FreeTextEntry1] : 33 year old white male with history of  Marfan syndrome,  s/p aortic aneurysm repair by Dr. Gómez Cloud on June 16 , 2015 with residual AR ( moderate - severe) . He reports intermittent palps . In the past he wore holter that was inconclusive. Mr. Ruiz was able to identify a SVT on the phone likely to be short RP tachycardia.\par \par # Marfans : s/p aortic aneurysm repair ( 2015) \par AR mod - severe \par asymptomatic \par \par # HTN BP stable \par BP Stable \par Continue Losartan 50 mg QD \par Encouraged Patient to monitor BP at home and keep a log and report results back to us for evaluation. Based on results, we will adjust medications as necessary. \par Additionally, encouraged heart healthy diet and exercise as tolerated.\par EKG with no acute changes.\par \par # Palpitations: SVT likely short RP tachycardia \par EP consultation recommended \par \par Interval Note\par January 10, 2023\par \par Mr. Ruiz is a 37 year old male that returns for a cardiovascular follow up.\par \par He carries a past medical history as outlined below:\par -Hx of Marfan's disease\par -Hx of Aortic aneurysm repair-> 2015\par -Hx of AR-> moderate to severe\par -Hx of Hypertension\par -Hx of palpitations-> AVNRT\par \par Blood pressure today: 132/69\par EKG- Sinus bradycardia, non specific T abnormality @ 59 bpm\par \par Reviewed echocardiogram performed today:\par LVEF 63%\par Normal mitral valve\par Transaortic valve gradient 28 mm Hg-> moderate to severe AR\par Normal systolic function\par No segmental wall motion abnormalities\par \par **Compared with echo from January 2022-> No significant changes\par \par \par Of note, had the COVID-19 virus last November\par MIld case, treated with Paxlovid\par \par Patient reports that his palpitations have improved with regular exercise, particularly jogging.\par \par Denies any active issues with shortness of breath or chest discomfort. \par \par \par

## 2023-01-12 NOTE — ASSESSMENT
[FreeTextEntry1] : Mr. 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 Gómez Cloud\par   Recommending a CT of the Chest for further evaluation\par \par # HTN\par   BP stable  \par  Continue Losartan 50 mg QD \par  Encouraged Patient to monitor BP at home and keep a log and report results back to us for evaluation. Based on   results, we will adjust medications as necessary. 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, patient defers consideration of ablation at this time\par    Feels that palps improve with activity\par \par - Encouraged patient to continue healthy exercise and eating habits, focusing on a Mediterranean style of eating and aiming for the recommended 150 minutes per week of moderate physical activity.\par \par - Encouraged the patient to find healthy outlets and coping mechanisms to help manage stress, such as physical activity/exercise, reducing workload if possible, spending time with family and friends, engaging in an enjoyable hobby, or using meditation or mindfulness techniques.\par \par Repeat full blood work panel:  CBC, CMP, Hgb A1C, TSH, Lipd panel\par \par \par \par \par

## 2023-02-27 ENCOUNTER — RX RENEWAL (OUTPATIENT)
Age: 38
End: 2023-02-27

## 2023-04-10 ENCOUNTER — NON-APPOINTMENT (OUTPATIENT)
Age: 38
End: 2023-04-10

## 2023-05-17 ENCOUNTER — NON-APPOINTMENT (OUTPATIENT)
Age: 38
End: 2023-05-17

## 2024-01-16 ENCOUNTER — APPOINTMENT (OUTPATIENT)
Dept: CV DIAGNOSITCS | Facility: HOSPITAL | Age: 39
End: 2024-01-16

## 2024-01-16 ENCOUNTER — APPOINTMENT (OUTPATIENT)
Dept: CARDIOLOGY | Facility: CLINIC | Age: 39
End: 2024-01-16
Payer: COMMERCIAL

## 2024-01-16 ENCOUNTER — NON-APPOINTMENT (OUTPATIENT)
Age: 39
End: 2024-01-16

## 2024-01-16 ENCOUNTER — OUTPATIENT (OUTPATIENT)
Dept: OUTPATIENT SERVICES | Facility: HOSPITAL | Age: 39
LOS: 1 days | End: 2024-01-16
Payer: COMMERCIAL

## 2024-01-16 VITALS
SYSTOLIC BLOOD PRESSURE: 142 MMHG | HEIGHT: 78 IN | OXYGEN SATURATION: 99 % | DIASTOLIC BLOOD PRESSURE: 72 MMHG | BODY MASS INDEX: 25.45 KG/M2 | HEART RATE: 58 BPM | WEIGHT: 220 LBS

## 2024-01-16 VITALS — DIASTOLIC BLOOD PRESSURE: 70 MMHG | SYSTOLIC BLOOD PRESSURE: 138 MMHG

## 2024-01-16 DIAGNOSIS — I10 ESSENTIAL (PRIMARY) HYPERTENSION: ICD-10-CM

## 2024-01-16 DIAGNOSIS — I35.1 NONRHEUMATIC AORTIC (VALVE) INSUFFICIENCY: ICD-10-CM

## 2024-01-16 DIAGNOSIS — Q87.40 MARFAN SYNDROME, UNSPECIFIED: ICD-10-CM

## 2024-01-16 DIAGNOSIS — Q87.40 MARFAN'S SYNDROME, UNSPECIFIED: ICD-10-CM

## 2024-01-16 DIAGNOSIS — E78.00 PURE HYPERCHOLESTEROLEMIA, UNSPECIFIED: ICD-10-CM

## 2024-01-16 PROCEDURE — 99214 OFFICE O/P EST MOD 30 MIN: CPT | Mod: 25

## 2024-01-16 PROCEDURE — 93000 ELECTROCARDIOGRAM COMPLETE: CPT

## 2024-01-16 PROCEDURE — 93306 TTE W/DOPPLER COMPLETE: CPT | Mod: 26

## 2024-01-19 NOTE — PHYSICAL EXAM
[Well Developed] : well developed [Well Nourished] : well nourished [No Acute Distress] : no acute distress [Normal Conjunctiva] : normal conjunctiva [Normal Venous Pressure] : normal venous pressure [No Carotid Bruit] : no carotid bruit [Normal S1, S2] : normal S1, S2 [No Murmur] : no murmur [No Rub] : no rub [No Gallop] : no gallop [Clear Lung Fields] : clear lung fields [Good Air Entry] : good air entry [No Respiratory Distress] : no respiratory distress  [Soft] : abdomen soft [Non Tender] : non-tender [No Masses/organomegaly] : no masses/organomegaly [Normal Bowel Sounds] : normal bowel sounds [Normal Gait] : normal gait [No Edema] : no edema [No Cyanosis] : no cyanosis [No Clubbing] : no clubbing [No Varicosities] : no varicosities [No Rash] : no rash [No Skin Lesions] : no skin lesions [Moves all extremities] : moves all extremities [No Focal Deficits] : no focal deficits [Normal Speech] : normal speech [Alert and Oriented] : alert and oriented [Normal memory] : normal memory [Normal] : normal [Normal Rate] : normal [Rhythm Regular] : regular

## 2024-01-20 NOTE — HISTORY OF PRESENT ILLNESS
[FreeTextEntry1] : 33 year old white male with history of  Marfan syndrome,  s/p aortic aneurysm repair by Dr. Gómez Cloud on June 16 , 2015 with residual AR ( moderate - severe) . He reports intermittent palps . In the past he wore holter that was inconclusive. Mr. Ruiz was able to identify a SVT on the phone likely to be short RP tachycardia.  # Marfans : s/p aortic aneurysm repair ( 2015)  AR mod - severe  asymptomatic   # HTN BP stable  BP Stable  Continue Losartan 50 mg QD  Encouraged Patient to monitor BP at home and keep a log and report results back to us for evaluation. Based on results, we will adjust medications as necessary.  Additionally, encouraged heart healthy diet and exercise as tolerated. EKG with no acute changes.  # Palpitations: SVT likely short RP tachycardia  EP consultation recommended   Interval Note January 10, 2023  Mr. Ruiz is a 37 year old male that returns for a cardiovascular follow up.  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  Denies any active issues with shortness of breath or chest discomfort.   Interval Note January 16, 2024  Mr. Ruiz is a 37 year old male that returns for a cardiovascular follow up.  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

## 2024-01-20 NOTE — ASSESSMENT
[FreeTextEntry1] : Mr. Ruiz is a 37 year old male that returns for a cardiovascular follow up.  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 If LDL, will consider starting on a statin  - Encouraged patient to participate in  healthy exercise and eating habits, focusing on a Mediterranean style of eating and aiming for the recommended 150 minutes per week of moderate physical activity.  - Encouraged the patient to find healthy outlets and coping mechanisms to help manage stress, such as physical activity/exercise, reducing workload if possible, spending time with family and friends, engaging in an enjoyable hobby, or using meditation or mindfulness techniques.

## 2024-01-20 NOTE — CARDIOLOGY SUMMARY
[___] : [unfilled] [LVEF ___%] : LVEF [unfilled]% [de-identified] : January 16, 2024-> EKG- Sinus bradycardia, non specific T abnormality @ 59 bpm  [de-identified] : CONCLUSIONS:\par  1. Normal mitral valve. Minimal mitral regurgitation.\par  2. Aortic valve leaflet morphology not well visualized.\par  Peak transaortic valve gradient equals 28 mm Hg, mean\par  transaortic valve gradient equals 15 mm Hg. Moderate-severe\par  aortic regurgitation.\par  3. Normal left ventricular internal dimensions and wall\par  thicknesses.\par  4. Normal left ventricular systolic function. No segmental\par  wall motion abnormalities.\par  5. Normal left ventricular diastolic function.\par  6. Normal right ventricular size and function.\par  *** Compared with echocardiogram of 01/25/2022, no\par  significant changes noted.\par  ------------------------------------------------------------------------\par  Confirmed on  1/10/2023 - 11:41:13 by Marcin Bates M.D.,\par  [facility]\par  ------------------------------------------------------------------------

## 2024-02-04 ENCOUNTER — NON-APPOINTMENT (OUTPATIENT)
Age: 39
End: 2024-02-04

## 2024-02-05 LAB
CHOLEST SERPL-MCNC: 194 MG/DL
HDLC SERPL-MCNC: 45 MG/DL
LDLC SERPL CALC-MCNC: 121 MG/DL
NONHDLC SERPL-MCNC: 149 MG/DL
TRIGL SERPL-MCNC: 157 MG/DL

## 2024-02-08 ENCOUNTER — NON-APPOINTMENT (OUTPATIENT)
Age: 39
End: 2024-02-08

## 2024-03-11 ENCOUNTER — APPOINTMENT (OUTPATIENT)
Dept: CARDIOLOGY | Facility: CLINIC | Age: 39
End: 2024-03-11
Payer: COMMERCIAL

## 2024-03-11 ENCOUNTER — RX RENEWAL (OUTPATIENT)
Age: 39
End: 2024-03-11

## 2024-03-11 VITALS
DIASTOLIC BLOOD PRESSURE: 76 MMHG | OXYGEN SATURATION: 99 % | RESPIRATION RATE: 16 BRPM | SYSTOLIC BLOOD PRESSURE: 145 MMHG | HEIGHT: 78 IN | HEART RATE: 56 BPM | WEIGHT: 212 LBS | TEMPERATURE: 97.3 F | BODY MASS INDEX: 24.53 KG/M2

## 2024-03-11 DIAGNOSIS — I10 ESSENTIAL (PRIMARY) HYPERTENSION: ICD-10-CM

## 2024-03-11 DIAGNOSIS — I71.9 AORTIC ANEURYSM OF UNSPECIFIED SITE, W/OUT RUPTURE: ICD-10-CM

## 2024-03-11 DIAGNOSIS — I35.1 NONRHEUMATIC AORTIC (VALVE) INSUFFICIENCY: ICD-10-CM

## 2024-03-11 DIAGNOSIS — E78.41 ELEVATED LIPOPROTEIN(A): ICD-10-CM

## 2024-03-11 DIAGNOSIS — E78.00 PURE HYPERCHOLESTEROLEMIA, UNSPECIFIED: ICD-10-CM

## 2024-03-11 PROCEDURE — G2211 COMPLEX E/M VISIT ADD ON: CPT

## 2024-03-11 PROCEDURE — 99214 OFFICE O/P EST MOD 30 MIN: CPT

## 2024-03-11 RX ORDER — ROSUVASTATIN CALCIUM 20 MG/1
20 TABLET, FILM COATED ORAL
Qty: 90 | Refills: 1 | Status: ACTIVE | COMMUNITY
Start: 2024-03-11 | End: 1900-01-01

## 2024-03-11 NOTE — DISCUSSION/SUMMARY
[FreeTextEntry1] : This is a 38-year-old male with past medical history significant for Marfan syndrome, status post surgery for an aortic root aneurysm June 16, 2015 at [facility], borderline hypertension, and elevated lipoprotein a who is referred for a lipid consultation. He denies chest pain, shortness of breath, dizziness or syncope.  He does not drink excessive caffeine or alcohol. Cardiac risk factors include borderline hypertension and elevated lipoprotein a is a cardiovascular risk enhancing feature. Family history significant for high cholesterol in both his parents and coronary artery disease in his mother.  Surgical history significant for descending aorta repair 2015.  Lipid panel done February 27, 2024 demonstrated total cholesterol 186, HDL of 49, triglycerides of 114, LDL cholesterol 115, non-HDL cholesterol 137 mg/dL, LDL direct 122 mg/dL, lipoprotein B and 95 mg/dL, and elevated lipoprotein a of 189 nmol/L. Lipid panel done January 16, 2024 demonstrated a triglyceride of 157, cholesterol 194, HDL 45, LDL calculated 121, non-HDL cholesterol 149 mg/dL. The patient will start Crestor 20 mg daily to reduce his LDL cholesterol.  I have told him that his lipoprotein a may increase but his overall cardiovascular risk will decrease. He will have follow-up blood in 6 to 8 weeks for lipid panel and SMA 20. He will follow-up with me in 6 months. I explained to him that currently there is no specific therapy for lipoprotein a, but hopefully with the next 3 to 4 years that will be therapeutic options for him. He also understands he must let his siblings now as well as his parents, as they should also be tested for elevated lipoprotein a. The patient understands that aerobic exercises must be increased to 40 minutes 4 times per week. A detailed discussion of lifestyle modification was done today. The patient has a good understanding of the diagnosis, and treatment plan. Lifestyle modification was also outlined.  Labs 2/27/24 demonstrated cholesterol 186, direct , HDL 49, triglycerides 114, apolipoprotein B 95, lipoprotein(a) 189. Patient was educated on the risks associated with elevated lipoprotein(a). Labs 1/16/24 demonstrated cholesterol 194, , HDL 45, triglycerides 157. EKG 1/16/24 demonstrated sinus bradycardia with rate 59, voltage criteria for LVH, diffuse nonspecific T abnormality.

## 2024-03-11 NOTE — PHYSICAL EXAM
[Well Developed] : well developed [Well Nourished] : well nourished [No Acute Distress] : no acute distress [Normal Conjunctiva] : normal conjunctiva [No Carotid Bruit] : no carotid bruit [Normal Venous Pressure] : normal venous pressure [Normal S1, S2] : normal S1, S2 [No Rub] : no rub [5th Left ICS - MCL] : palpated at the 5th LICS in the midclavicular line [Normal] : normal [No Precordial Heave] : no precordial heave was noted [Normal Rate] : normal [Rhythm Regular] : regular [Normal S1] : normal S1 [Normal S2] : normal S2 [No Gallop] : no gallop heard [II] : a grade 2 [No Pitting Edema] : no pitting edema present [2+] : left 2+ [Clear Lung Fields] : clear lung fields [No Abnormalities] : the abdominal aorta was not enlarged and no bruit was heard [Good Air Entry] : good air entry [No Respiratory Distress] : no respiratory distress  [Soft] : abdomen soft [Non Tender] : non-tender [No Masses/organomegaly] : no masses/organomegaly [Normal Gait] : normal gait [Normal Bowel Sounds] : normal bowel sounds [No Edema] : no edema [No Cyanosis] : no cyanosis [No Clubbing] : no clubbing [No Varicosities] : no varicosities [No Rash] : no rash [No Skin Lesions] : no skin lesions [Moves all extremities] : moves all extremities [No Focal Deficits] : no focal deficits [Normal Speech] : normal speech [Alert and Oriented] : alert and oriented [Normal memory] : normal memory [S3] : no S3 [S4] : no S4 [Right Carotid Bruit] : no bruit heard over the right carotid [Left Carotid Bruit] : no bruit heard over the left carotid [Right Femoral Bruit] : no bruit heard over the right femoral artery [Left Femoral Bruit] : no bruit heard over the left femoral artery

## 2024-05-07 ENCOUNTER — TRANSCRIPTION ENCOUNTER (OUTPATIENT)
Age: 39
End: 2024-05-07

## 2024-09-09 ENCOUNTER — RX RENEWAL (OUTPATIENT)
Age: 39
End: 2024-09-09

## 2024-09-17 DIAGNOSIS — E78.41 ELEVATED LIPOPROTEIN(A): ICD-10-CM

## 2024-09-17 DIAGNOSIS — E78.00 PURE HYPERCHOLESTEROLEMIA, UNSPECIFIED: ICD-10-CM

## 2024-09-20 LAB
ALBUMIN SERPL ELPH-MCNC: 4.6 G/DL
ALP BLD-CCNC: 55 U/L
ALT SERPL-CCNC: 16 U/L
ANION GAP SERPL CALC-SCNC: 10 MMOL/L
AST SERPL-CCNC: 21 U/L
BILIRUB DIRECT SERPL-MCNC: 0.2 MG/DL
BILIRUB INDIRECT SERPL-MCNC: 0.6 MG/DL
BILIRUB SERPL-MCNC: 0.8 MG/DL
BUN SERPL-MCNC: 18 MG/DL
CALCIUM SERPL-MCNC: 10 MG/DL
CHLORIDE SERPL-SCNC: 106 MMOL/L
CHOLEST SERPL-MCNC: 115 MG/DL
CO2 SERPL-SCNC: 25 MMOL/L
CREAT SERPL-MCNC: 0.91 MG/DL
EGFR: 109 ML/MIN/1.73M2
ESTIMATED AVERAGE GLUCOSE: 97 MG/DL
GLUCOSE SERPL-MCNC: 94 MG/DL
HBA1C MFR BLD HPLC: 5 %
HDLC SERPL-MCNC: 47 MG/DL
LDLC SERPL CALC-MCNC: 51 MG/DL
LDLC SERPL DIRECT ASSAY-MCNC: 57 MG/DL
NONHDLC SERPL-MCNC: 67 MG/DL
POTASSIUM SERPL-SCNC: 4.2 MMOL/L
PROT SERPL-MCNC: 6.8 G/DL
SODIUM SERPL-SCNC: 141 MMOL/L
TRIGL SERPL-MCNC: 81 MG/DL
TSH SERPL-ACNC: 2.1 UIU/ML
URATE SERPL-MCNC: 4.6 MG/DL

## 2024-10-14 ENCOUNTER — APPOINTMENT (OUTPATIENT)
Dept: CARDIOLOGY | Facility: CLINIC | Age: 39
End: 2024-10-14
Payer: COMMERCIAL

## 2024-10-14 VITALS
WEIGHT: 210 LBS | HEIGHT: 78 IN | HEART RATE: 67 BPM | TEMPERATURE: 98.5 F | SYSTOLIC BLOOD PRESSURE: 125 MMHG | RESPIRATION RATE: 16 BRPM | DIASTOLIC BLOOD PRESSURE: 78 MMHG | BODY MASS INDEX: 24.3 KG/M2 | OXYGEN SATURATION: 98 %

## 2024-10-14 DIAGNOSIS — E78.41 ELEVATED LIPOPROTEIN(A): ICD-10-CM

## 2024-10-14 DIAGNOSIS — I10 ESSENTIAL (PRIMARY) HYPERTENSION: ICD-10-CM

## 2024-10-14 DIAGNOSIS — E78.00 PURE HYPERCHOLESTEROLEMIA, UNSPECIFIED: ICD-10-CM

## 2024-10-14 PROCEDURE — 99214 OFFICE O/P EST MOD 30 MIN: CPT

## 2024-10-14 PROCEDURE — G2211 COMPLEX E/M VISIT ADD ON: CPT | Mod: NC

## 2025-01-06 ENCOUNTER — NON-APPOINTMENT (OUTPATIENT)
Age: 40
End: 2025-01-06

## 2025-02-03 ENCOUNTER — APPOINTMENT (OUTPATIENT)
Dept: CARDIOLOGY | Facility: CLINIC | Age: 40
End: 2025-02-03

## 2025-02-04 ENCOUNTER — OUTPATIENT (OUTPATIENT)
Dept: OUTPATIENT SERVICES | Facility: HOSPITAL | Age: 40
LOS: 1 days | End: 2025-02-04
Payer: COMMERCIAL

## 2025-02-04 ENCOUNTER — RESULT REVIEW (OUTPATIENT)
Age: 40
End: 2025-02-04

## 2025-02-04 ENCOUNTER — NON-APPOINTMENT (OUTPATIENT)
Age: 40
End: 2025-02-04

## 2025-02-04 ENCOUNTER — APPOINTMENT (OUTPATIENT)
Dept: CV DIAGNOSITCS | Facility: HOSPITAL | Age: 40
End: 2025-02-04

## 2025-02-04 DIAGNOSIS — I10 ESSENTIAL (PRIMARY) HYPERTENSION: ICD-10-CM

## 2025-02-04 DIAGNOSIS — Q87.40 MARFAN SYNDROME, UNSPECIFIED: ICD-10-CM

## 2025-02-04 DIAGNOSIS — I35.1 NONRHEUMATIC AORTIC (VALVE) INSUFFICIENCY: ICD-10-CM

## 2025-02-04 PROCEDURE — 93306 TTE W/DOPPLER COMPLETE: CPT | Mod: 26

## 2025-02-10 ENCOUNTER — APPOINTMENT (OUTPATIENT)
Dept: CARDIOLOGY | Facility: CLINIC | Age: 40
End: 2025-02-10
Payer: COMMERCIAL

## 2025-02-10 ENCOUNTER — NON-APPOINTMENT (OUTPATIENT)
Age: 40
End: 2025-02-10

## 2025-02-10 VITALS
HEIGHT: 78 IN | BODY MASS INDEX: 23.72 KG/M2 | OXYGEN SATURATION: 100 % | WEIGHT: 205 LBS | SYSTOLIC BLOOD PRESSURE: 129 MMHG | HEART RATE: 61 BPM | DIASTOLIC BLOOD PRESSURE: 70 MMHG

## 2025-02-10 DIAGNOSIS — Q87.40 MARFAN'S SYNDROME, UNSPECIFIED: ICD-10-CM

## 2025-02-10 DIAGNOSIS — E78.41 ELEVATED LIPOPROTEIN(A): ICD-10-CM

## 2025-02-10 DIAGNOSIS — E78.00 PURE HYPERCHOLESTEROLEMIA, UNSPECIFIED: ICD-10-CM

## 2025-02-10 DIAGNOSIS — I47.10 SUPRAVENTRICULAR TACHYCARDIA, UNSPECIFIED: ICD-10-CM

## 2025-02-10 DIAGNOSIS — I35.1 NONRHEUMATIC AORTIC (VALVE) INSUFFICIENCY: ICD-10-CM

## 2025-02-10 DIAGNOSIS — I10 ESSENTIAL (PRIMARY) HYPERTENSION: ICD-10-CM

## 2025-02-10 DIAGNOSIS — I71.9 AORTIC ANEURYSM OF UNSPECIFIED SITE, W/OUT RUPTURE: ICD-10-CM

## 2025-02-10 PROCEDURE — 93000 ELECTROCARDIOGRAM COMPLETE: CPT

## 2025-02-10 PROCEDURE — 99215 OFFICE O/P EST HI 40 MIN: CPT | Mod: 25

## 2025-02-10 RX ORDER — FLUOXETINE HYDROCHLORIDE 20 MG/1
20 TABLET ORAL DAILY
Qty: 30 | Refills: 3 | Status: ACTIVE | COMMUNITY
Start: 2025-02-10

## 2025-02-13 PROBLEM — I35.1 MODERATE TO SEVERE AORTIC VALVE REGURGITATION: Status: ACTIVE | Noted: 2025-02-13

## 2025-02-13 PROBLEM — I47.10 PAROXYSMAL SVT (SUPRAVENTRICULAR TACHYCARDIA): Status: ACTIVE | Noted: 2022-01-25

## 2025-02-13 PROBLEM — I35.1 MILD AORTIC REGURGITATION: Status: RESOLVED | Noted: 2023-01-12 | Resolved: 2025-02-13

## 2025-03-17 ENCOUNTER — RX RENEWAL (OUTPATIENT)
Age: 40
End: 2025-03-17

## 2025-03-19 ENCOUNTER — APPOINTMENT (OUTPATIENT)
Dept: UROLOGY | Facility: CLINIC | Age: 40
End: 2025-03-19
Payer: COMMERCIAL

## 2025-03-19 VITALS
OXYGEN SATURATION: 98 % | HEART RATE: 64 BPM | RESPIRATION RATE: 16 BRPM | BODY MASS INDEX: 23.72 KG/M2 | WEIGHT: 205 LBS | DIASTOLIC BLOOD PRESSURE: 67 MMHG | SYSTOLIC BLOOD PRESSURE: 126 MMHG | HEIGHT: 78 IN

## 2025-03-19 DIAGNOSIS — R39.15 URGENCY OF URINATION: ICD-10-CM

## 2025-03-19 DIAGNOSIS — R39.15 BENIGN PROSTATIC HYPERPLASIA WITH LOWER URINARY TRACT SYMPMS: ICD-10-CM

## 2025-03-19 DIAGNOSIS — N40.1 BENIGN PROSTATIC HYPERPLASIA WITH LOWER URINARY TRACT SYMPMS: ICD-10-CM

## 2025-03-19 DIAGNOSIS — M62.89 OTHER SPECIFIED DISORDERS OF MUSCLE: ICD-10-CM

## 2025-03-19 PROCEDURE — 99204 OFFICE O/P NEW MOD 45 MIN: CPT

## 2025-03-19 PROCEDURE — 51798 US URINE CAPACITY MEASURE: CPT

## 2025-03-19 RX ORDER — OXYBUTYNIN CHLORIDE 5 MG/1
5 TABLET ORAL
Qty: 30 | Refills: 3 | Status: ACTIVE | COMMUNITY
Start: 2025-03-19 | End: 1900-01-01

## 2025-03-20 LAB
APPEARANCE: CLEAR
BACTERIA UR CULT: NORMAL
BACTERIA: NEGATIVE /HPF
BILIRUBIN URINE: NEGATIVE
BLOOD URINE: NEGATIVE
CAST: 0 /LPF
COLOR: YELLOW
EPITHELIAL CELLS: 0 /HPF
GLUCOSE QUALITATIVE U: NEGATIVE MG/DL
KETONES URINE: NEGATIVE MG/DL
LEUKOCYTE ESTERASE URINE: NEGATIVE
MICROSCOPIC-UA: NORMAL
NITRITE URINE: NEGATIVE
PH URINE: 6
PROTEIN URINE: NEGATIVE MG/DL
RED BLOOD CELLS URINE: 1 /HPF
REVIEW: NORMAL
SPECIFIC GRAVITY URINE: 1.02
SPERM-LIKE CELLS: PRESENT
UROBILINOGEN URINE: 1 MG/DL
WHITE BLOOD CELLS URINE: 1 /HPF

## 2025-06-12 ENCOUNTER — LABORATORY RESULT (OUTPATIENT)
Age: 40
End: 2025-06-12

## 2025-06-19 ENCOUNTER — APPOINTMENT (OUTPATIENT)
Dept: CARDIOLOGY | Facility: CLINIC | Age: 40
End: 2025-06-19
Payer: COMMERCIAL

## 2025-06-19 VITALS
WEIGHT: 202 LBS | HEART RATE: 57 BPM | HEIGHT: 78 IN | TEMPERATURE: 98.2 F | BODY MASS INDEX: 23.37 KG/M2 | SYSTOLIC BLOOD PRESSURE: 118 MMHG | RESPIRATION RATE: 16 BRPM | OXYGEN SATURATION: 99 % | DIASTOLIC BLOOD PRESSURE: 70 MMHG

## 2025-06-19 PROCEDURE — G2211 COMPLEX E/M VISIT ADD ON: CPT | Mod: NC

## 2025-06-19 PROCEDURE — 99214 OFFICE O/P EST MOD 30 MIN: CPT

## 2025-06-19 RX ORDER — EZETIMIBE 10 MG/1
10 TABLET ORAL DAILY
Qty: 90 | Refills: 3 | Status: ACTIVE | COMMUNITY
Start: 2025-06-19 | End: 1900-01-01

## 2025-07-17 ENCOUNTER — APPOINTMENT (OUTPATIENT)
Dept: FAMILY MEDICINE | Facility: CLINIC | Age: 40
End: 2025-07-17

## 2025-07-17 VITALS
WEIGHT: 211 LBS | HEART RATE: 55 BPM | DIASTOLIC BLOOD PRESSURE: 70 MMHG | HEIGHT: 78 IN | OXYGEN SATURATION: 98 % | SYSTOLIC BLOOD PRESSURE: 104 MMHG | BODY MASS INDEX: 24.41 KG/M2

## 2025-07-17 VITALS — SYSTOLIC BLOOD PRESSURE: 102 MMHG | DIASTOLIC BLOOD PRESSURE: 50 MMHG

## 2025-07-17 PROBLEM — R53.83 FATIGUE, UNSPECIFIED TYPE: Status: ACTIVE | Noted: 2025-07-17

## 2025-07-17 PROCEDURE — 36415 COLL VENOUS BLD VENIPUNCTURE: CPT

## 2025-07-17 PROCEDURE — 99396 PREV VISIT EST AGE 40-64: CPT

## 2025-07-21 LAB
APPEARANCE: CLEAR
BACTERIA: NEGATIVE /HPF
BASOPHILS # BLD AUTO: 0.02 K/UL
BASOPHILS NFR BLD AUTO: 0.5 %
BILIRUBIN URINE: NEGATIVE
BLOOD URINE: NEGATIVE
CAST: 0 /LPF
COLOR: YELLOW
EOSINOPHIL # BLD AUTO: 0.07 K/UL
EOSINOPHIL NFR BLD AUTO: 1.7 %
EPITHELIAL CELLS: 0 /HPF
ESTIMATED AVERAGE GLUCOSE: 100 MG/DL
GGT SERPL-CCNC: 6 U/L
GLUCOSE QUALITATIVE U: NEGATIVE MG/DL
HBA1C MFR BLD HPLC: 5.1 %
HCT VFR BLD CALC: 45.1 %
HGB BLD-MCNC: 14.9 G/DL
IMM GRANULOCYTES NFR BLD AUTO: 0.2 %
KETONES URINE: NEGATIVE MG/DL
LEUKOCYTE ESTERASE URINE: NEGATIVE
LYMPHOCYTES # BLD AUTO: 1.02 K/UL
LYMPHOCYTES NFR BLD AUTO: 24.9 %
MAN DIFF?: NORMAL
MCHC RBC-ENTMCNC: 31 PG
MCHC RBC-ENTMCNC: 33 G/DL
MCV RBC AUTO: 93.8 FL
MICROSCOPIC-UA: NORMAL
MONOCYTES # BLD AUTO: 0.33 K/UL
MONOCYTES NFR BLD AUTO: 8.1 %
NEUTROPHILS # BLD AUTO: 2.64 K/UL
NEUTROPHILS NFR BLD AUTO: 64.6 %
NITRITE URINE: NEGATIVE
PH URINE: 6.5
PLATELET # BLD AUTO: 211 K/UL
PROTEIN URINE: NEGATIVE MG/DL
PSA FREE FLD-MCNC: 57 %
PSA FREE SERPL-MCNC: 0.23 NG/ML
PSA SERPL-MCNC: 0.4 NG/ML
RBC # BLD: 4.81 M/UL
RBC # FLD: 14.2 %
RED BLOOD CELLS URINE: 0 /HPF
SPECIFIC GRAVITY URINE: 1.01
T3FREE SERPL-MCNC: 2.9 PG/ML
T4 FREE SERPL-MCNC: 1.1 NG/DL
TESTOST FREE SERPL-MCNC: 5.9 PG/ML
TESTOST SERPL-MCNC: 595 NG/DL
TSH SERPL-ACNC: 1.17 UIU/ML
URATE SERPL-MCNC: 3.8 MG/DL
UROBILINOGEN URINE: 0.2 MG/DL
WBC # FLD AUTO: 4.09 K/UL
WHITE BLOOD CELLS URINE: 0 /HPF